# Patient Record
Sex: FEMALE | Race: WHITE
[De-identification: names, ages, dates, MRNs, and addresses within clinical notes are randomized per-mention and may not be internally consistent; named-entity substitution may affect disease eponyms.]

---

## 2018-01-06 ENCOUNTER — HOSPITAL ENCOUNTER (EMERGENCY)
Dept: HOSPITAL 80 - CED | Age: 53
Discharge: HOME | End: 2018-01-06
Payer: COMMERCIAL

## 2018-01-06 VITALS
DIASTOLIC BLOOD PRESSURE: 80 MMHG | HEART RATE: 84 BPM | OXYGEN SATURATION: 96 % | TEMPERATURE: 98.1 F | RESPIRATION RATE: 16 BRPM | SYSTOLIC BLOOD PRESSURE: 131 MMHG

## 2018-01-06 DIAGNOSIS — Z98.890: ICD-10-CM

## 2018-01-06 DIAGNOSIS — G89.18: Primary | ICD-10-CM

## 2018-01-06 NOTE — EDPHY
H & P


Time Seen by Provider: 18 09:49


HPI/ROS: 





HPI


Concerned about foot infection.





52-year-old female by private vehicle.  This patient had bunion surgery on her 

right foot, specifically the lateral metatarsal phalangeal joint area of the 

distal right foot.  She had this surgery done by a podiatrist in November.  She 

reports that she has had pain involving the ventral aspect of the MTP joints, 

4.  And 5.  She followed up with her podiatrist on  and was re-

evaluated at that time.  She was told was no evidence of infection.  She 

reports that since that time she has had worsening pain to this area.  She 

reports that last night she had some redness and swelling involving the right 

5th toe and 5th metatarsophalangeal joint area.  She reports that the pain has 

persisted and the area is very sensitive to touch.  She is concerned about 

infection.  She has a follow-up appointment with her podiatrist on Monday to be 

re-evaluated.  No history of trauma. She has not had a fever.





ROS:





Constitutional:  No fever, no chills.  No weakness.


Musculoskeletal:  As above.


Skin:  No rashes. As above.


Neurological: No focal weakness or altered sensation.





Past medical history:  As above.  She otherwise denies any significant past 

medical history.  No allergies to penicillins or other antibiotics.





Social history:  Here by herself.  Nonsmoker.





Physical Exam:





General Appearance:  Alert, no distress.  This patient is responding to 

questions appropriately and in full sentences.  This patient appears well-

hydrated and well-nourished.


Eyes:  Pupils equal and round no pallor or injection.  No lid edema, erythema 

or injection.


Right foot exam:  She has got a superficial abrasion over the lateral aspect of 

the 5th metatarsophalangeal joint.  There is no associated erythema or 

significant swelling. No warmth on touch and comparison to her left foot.  

There is no significant edema involving the right foot and no asymmetry in 

comparison to the left foot.  She does have tenderness on palpation to this 

area.  The right foot is otherwise neurovascularly intact.


Neurological:  Motor sensory function is grossly intact.  Cranial nerves are 

normal.  Gait is normal.


Skin:  Warm and dry, no rashes.


Extremities are symmetrical.  All joints range without pain or impingement.


Psychiatric:  No agitation.  No depression.





Database:





EKG:





Imaging:





Procedures:





Emergency department course:





Vital signs reviewed and are normal.  She is afebrile.  Her presentation is not 

consistent with a cellulitis or postsurgical infection.  Her presentation is 

also not consistent with gouty arthritis follow this is a possibility.  She is 

very concerned about the development of an infection and was told by her 

podiatrist apparently over the phone to come to the emergency department to be 

evaluated and to be started on an antibiotic.  I explained to her that at this 

time I felt that there was not a clear indication to start her on antibiotics 

however after further discussion I agreed to start her on Augmentin in the 

emergency department.  She will then see her podiatrist on Monday as scheduled 

to be re-evaluated.  The antibiotic can then either be discontinued or 

continued based on her exam and evaluation by her podiatrist.  She is in 

agreement with this plan.  Return to emergency department precautions were 

reviewed with her.  She was given 875 mg of Augmentin in the emergency 

department.  I discussed ibuprofen dosing for pain medication.  She does not 

want anything stronger.  Return to emergency department precautions were 

thoroughly reviewed.  All of her questions were answered.  She was discharged 

in good condition.





Differential Diagnosis:





The differential diagnosis on this patient includes but is not limited to 

postsurgical soft tissue infection of the right foot, gouty arthritis, pseudo 

arthritis, osteoarthritis, cellulitis, inflammatory nerve pain.  This 

represents a partial list of diagnoses considered.  These considerations are 

based on history, physical exam, past history, reassessment and diagnostic 

testing.


Smoking Status: Never smoked


Constitutional: 


 Initial Vital Signs











Temperature (C)  36.7 C   18 09:57


 


Heart Rate  84   18 09:57


 


Respiratory Rate  16   18 09:57


 


Blood Pressure  131/80 H  18 09:57


 


O2 Sat (%)  96   18 09:57








 











O2 Delivery Mode               Room Air














Allergies/Adverse Reactions: 


 





oxycodone Allergy (Verified 18 10:18)


 Hives


valacyclovir [From Valtrex] Allergy (Verified 18 10:19)


 Other-Enter Comments








Home Medications: 














 Medication  Instructions  Recorded


 


Amitriptyline HCl  18


 


Amoxicillin/Clavulanate Pot 875 mg PO BID 7 Days  tab 18





[Augmentin 875 mg tab]  


 


Estogen Patch  18


 


Norethindrone Acetate  18














Medical Decision Making





- Data Points


Medications Given: 


 








Discontinued Medications





Amoxicillin/Clavulanate Potassium (Augmentin 875mg)  875 mg PO EDNOW ONE


   PRN Reason: Protocol


   Stop: 18 10:11


   Last Admin: 18 10:24 Dose:  875 mg








Departure





- Departure


Disposition: Home, Routine, Self-Care


Clinical Impression: 


 Right foot pain, Status post right foot surgery





Condition: Good


Instructions:  Metatarsalgia (DC)


Additional Instructions: 


Read and follow provided instructions.





Follow-up with your primary care physician or podiatrist as scheduled on Monday 

for re-evaluation and further management.





Take antibiotic as prescribed unless otherwise discontinued or changed by her 

primary care physician or podiatrist.





Discuss probiotics to take with antibiotic with pharmacist as reviewed here in 

the emergency department.





Ibuprofen dosin mg every 6 hours with meals for the next 3 days only.  

Take only as needed for pain.





Return to the emergency department immediately for redness or swelling of the 

right foot or toes, fever, uncontrolled pain or other serious concerns.


Referrals: 


NONE *PRIMARY CARE P,. [Primary Care Provider] - As per Instructions


Prescriptions: 


Amoxicillin/Clavulanate Pot [Augmentin 875 mg tab] 875 mg PO BID 7 Days  tab